# Patient Record
Sex: FEMALE | Race: ASIAN | NOT HISPANIC OR LATINO | URBAN - METROPOLITAN AREA
[De-identification: names, ages, dates, MRNs, and addresses within clinical notes are randomized per-mention and may not be internally consistent; named-entity substitution may affect disease eponyms.]

---

## 2019-03-31 ENCOUNTER — EMERGENCY (EMERGENCY)
Facility: HOSPITAL | Age: 24
LOS: 1 days | Discharge: HOME | End: 2019-03-31
Attending: EMERGENCY MEDICINE
Payer: COMMERCIAL

## 2019-03-31 VITALS
HEART RATE: 73 BPM | OXYGEN SATURATION: 98 % | RESPIRATION RATE: 17 BRPM | TEMPERATURE: 98 F | SYSTOLIC BLOOD PRESSURE: 109 MMHG | DIASTOLIC BLOOD PRESSURE: 65 MMHG

## 2019-03-31 VITALS
HEIGHT: 65 IN | HEART RATE: 80 BPM | OXYGEN SATURATION: 100 % | WEIGHT: 141.98 LBS | DIASTOLIC BLOOD PRESSURE: 98 MMHG | SYSTOLIC BLOOD PRESSURE: 136 MMHG | RESPIRATION RATE: 18 BRPM | TEMPERATURE: 98 F

## 2019-03-31 PROCEDURE — 99283 EMERGENCY DEPT VISIT LOW MDM: CPT

## 2019-03-31 RX ORDER — SODIUM CHLORIDE 9 MG/ML
1000 INJECTION INTRAMUSCULAR; INTRAVENOUS; SUBCUTANEOUS ONCE
Qty: 0 | Refills: 0 | Status: COMPLETED | OUTPATIENT
Start: 2019-03-31 | End: 2019-03-31

## 2019-03-31 RX ADMIN — SODIUM CHLORIDE 1000 MILLILITER(S): 9 INJECTION INTRAMUSCULAR; INTRAVENOUS; SUBCUTANEOUS at 22:06

## 2019-03-31 RX ADMIN — Medication 300 MILLIGRAM(S): at 23:07

## 2019-03-31 NOTE — ED PROVIDER NOTE - CARE PLAN
Goal:	**ATTENDING ADDENDUM (Dr. Fabio Magaña): Goals of care include resolution of emergent/urgent symptoms and concerns, and restoration to baseline level of homeostasis. Principal Discharge DX:	Cellulitis  Goal:	**ATTENDING ADDENDUM (Dr. Fabio Magaña): Goals of care include resolution of emergent/urgent symptoms and concerns, and restoration to baseline level of homeostasis.

## 2019-03-31 NOTE — ED PROVIDER NOTE - OBJECTIVE STATEMENT
24yo female pt, no significant PMHx, ambulatory 24yo female pt, no significant PMHx, ambulatory c/o worsening redness of wound, left upper back today. Pt stated she had recent admission at [facility] in [facility] for left upper back cellulitis s/p ? bug bite and discharged 2days ago with Augmentin. She noticed today the wound's worsen and a new rash on left upper arm. Denies fever, chills or bodyache. Denies headache, dizziness or visual changes. Denies CP/SOB/ABD pain or N/V/D. Denies sensory changes or weakness to extremities. 24yo female pt, no significant PMHx, ambulatory c/o worsening redness of wound, left upper back today. Pt stated she had recent admission at [facility] in [facility] for left upper back cellulitis s/p ? bug bite and discharged 2days ago with Augmentin. She noticed today the wound's worsen and a new rash on left upper arm. Denies fever, chills or bodyache. Denies headache, dizziness or visual changes. Denies CP/SOB/ABD pain or N/V/D. Denies sensory changes or weakness to extremities.  **ATTENDING ADDENDUM (Dr. Fabio Magaña): I attest that I have directly examined this patient and elicited a comparable history of present illness and review of systems with my collaborating provider (NP/PA). I attest that I have made significant contributions to the documentation where necessary and as noted in the EMR.

## 2019-03-31 NOTE — ED PROVIDER NOTE - CLINICAL SUMMARY MEDICAL DECISION MAKING FREE TEXT BOX
**ATTENDING MEDICAL DECISION MAKING/SYNTHESIS (Dr. Fabio Magaña): I have reviewed the Chief Complaint, the HPI, the ROS, and have directly performed and confirmed the findings on the Physical Examination. I have reviewed the medical decision making with all providers, as applicable. The PROBLEM REPRESENTATION at this time is: 23-year-old woman s/p recent admission at [facility] with worsening rash ?cellulitis s/p oral antibiotics following IV antibiotics and inpatient dermatology consultation now presenting with progressively worsening rash along the posterior aspect of the left shoulder and lateral upper arm. The MOST LIKELY DIAGNOSIS, and the LIST OF DIFFERENTIAL DIAGNOSES, includes (but is not limited to) the following: cellulitis, other inflammatory contact dermatitis or equivalent, allergic reaction, herpes zoster (considered, NO vesicles, therefore unlikely given presentation and clinical findings), serious bacterial infection or sepsis/severe sepsis e.g. MRSA or equivalent (less likely), necrotizing fasciitis (unlikely given presentation and clinical findings), erythema nodusum (NO evidence), insect or other bite (e.g. bed bugs or equivalent; considered). The likelihood of each of these diagnoses has been appropriately considered in the context of this patient's presentation and my evaluation. PLAN: as described in EMR, including diagnostics, therapeutics and consultation as clinically warranted. I will continue to reevaluate the patient, including the results of all testing, and monitor response to therapy throughout the patient's course in the ED.

## 2019-03-31 NOTE — ED PROVIDER NOTE - NSFOLLOWUPCLINICS_GEN_ALL_ED_FT
[facility] - [place]  [facility]  [address], [address]  [place] [address]  Phone: [phone]  Fax: [phone]  Follow Up Time:

## 2019-03-31 NOTE — ED PROVIDER NOTE - NSFOLLOWUPINSTRUCTIONS_ED_ALL_ED_FT
Keep wound clean and dry.  Keep continue Zyrtec for itching as directed.  Stop taking Augmentin and start Clindamycin 300mg every 6hours for 7days.  Please follow up with your dermatology or clinic [phone],  in 2days, call tomorrow for an appointment.  Seek immediate medical care for any new/worsening signs or symptoms.

## 2019-03-31 NOTE — ED PROVIDER NOTE - ATTENDING CONTRIBUTION TO CARE
**ATTENDING ADDENDUM (Dr. Fabio Magaña): I attest that I have directly examined this patient and reviewed and formulated the diagnostic and therapeutic management plan in collaboration with the advanced practitioner (NP, PA). Please see MDM note and remainder of EMR for findings from CC, HPI, ROS, and PE. ([place])

## 2019-03-31 NOTE — ED PROVIDER NOTE - PROGRESS NOTE DETAILS
**ATTENDING ADDENDUM (Dr. Fabio Magaña): patient serially evaluated throughout ED course. NO acute deterioration up to this time in the ED. Agree with goals/plan of ED care as described in EMR, including diagnostics, therapeutics and consultation as clinically warranted. Agree with change in antibiotics to clindamycin to provide possible coverage for MRSA (although this pathogen seems unlikely). Anticipatory guidance provided. NO evidence of septic shock, herpes zoster, necrotizing fasciitis, or other worrisome soft-tissue pathology of the skin. Agree with discharge home with close outpatient followup with primary care physician/provider.

## 2019-03-31 NOTE — ED PROVIDER NOTE - CHIEF COMPLAINT
The patient is a 23y Female complaining of wound check. The patient is a 23y Female complaining of wound check and skin erythema s/p possible insect bite to upper left shoulder/back area.

## 2019-03-31 NOTE — ED ADULT NURSE NOTE - NSIMPLEMENTINTERV_GEN_ALL_ED
Implemented All Universal Safety Interventions:  [place] to call system. Call bell, personal items and telephone within reach. Instruct patient to call for assistance. Room bathroom lighting operational. Non-slip footwear when patient is off stretcher. Physically safe environment: no spills, clutter or unnecessary equipment. Stretcher in lowest position, wheels locked, appropriate side rails in place.

## 2019-03-31 NOTE — ED PROVIDER NOTE - PHYSICAL EXAMINATION
NAd, VSS, Afebrile, Lungs clear. + Left upper back elevated rashes with warmth/ induration. No fluctuating abscess. + A small rash on left deltoid area without infection. No spinal tender. ABD soft, non tender. No CVA tender. Neuro- intact. NAd, VSS, Afebrile, Lungs clear. + Left upper back elevated rashes with warmth/ induration. No fluctuating abscess. + A small rash on left deltoid area without infection. No spinal tender. ABD soft, non tender. No CVA tender. Neuro- intact.  **ATTENDING ADDENDUM (Dr. Fabio Magaña): I have reviewed and substantially contributed to the elements of the PE as documented above. I have directly performed an examination of this patient in conjunction with the other members (EM resident/PA/NP) of the patient care team. Of note, and in addition to the above, examination of the patient's skin over the posterior left shoulder and left lateral upper arm reveals well-circumscribed areas of erythema without obvious vesicles or fluctuance but with warmth. NO contiguous spread. NO lymphangitic spread or lymphadenopathy noted. NO centralized areas of necrosis noted. NO discharge or crusting. NO desquamation noted. NO distal discoloration or numbness, tingling, weakness, or paresthesias noted in the left upper arm. Intact perfusion (distal pulse and capillary refill).

## 2019-03-31 NOTE — ED PROVIDER NOTE - PLAN OF CARE
**ATTENDING ADDENDUM (Dr. Fabio Magaña): Goals of care include resolution of emergent/urgent symptoms and concerns, and restoration to baseline level of homeostasis.

## 2019-03-31 NOTE — ED PROVIDER NOTE - RELIEVING FACTORS
**ATTENDING ADDENDUM (Dr. Fabio Magaña): on antibiotics over the past two days (amoxicillin-clavulanate)

## 2019-03-31 NOTE — ED ADULT NURSE NOTE - OBJECTIVE STATEMENT
dc 2 days ago from [facility] in [facility]; had bug bites on left upper back; does not know what bit her; wounds have returned; new lesions on left upper arm; has red lesions on back; on amox--clav 875-125 and cetirizine for itching; applied mupirocin cream to sites; denies fever and chills dc 2 days ago from [facility] in [facility]; had bug bites on left upper back; does not know what bit her; wounds have returned; new lesions on left upper arm; has red lesions on back; on amox--clav 875-125 and cetirizine for itching; applied mupirocin cream to sites; denies fever and chills; denies pain to area but area is itchy and burning

## 2020-03-05 ENCOUNTER — TRANSCRIPTION ENCOUNTER (OUTPATIENT)
Age: 25
End: 2020-03-05

## 2020-07-17 ENCOUNTER — RESULT REVIEW (OUTPATIENT)
Age: 25
End: 2020-07-17

## 2020-07-31 ENCOUNTER — APPOINTMENT (OUTPATIENT)
Dept: FAMILY MEDICINE | Facility: CLINIC | Age: 25
End: 2020-07-31
Payer: COMMERCIAL

## 2020-07-31 VITALS
DIASTOLIC BLOOD PRESSURE: 71 MMHG | TEMPERATURE: 98.7 F | HEART RATE: 70 BPM | WEIGHT: 144.83 LBS | OXYGEN SATURATION: 98 % | BODY MASS INDEX: 24.42 KG/M2 | HEIGHT: 64.57 IN | SYSTOLIC BLOOD PRESSURE: 112 MMHG

## 2020-07-31 DIAGNOSIS — Z00.00 ENCOUNTER FOR GENERAL ADULT MEDICAL EXAMINATION W/OUT ABNORMAL FINDINGS: ICD-10-CM

## 2020-07-31 DIAGNOSIS — Z78.9 OTHER SPECIFIED HEALTH STATUS: ICD-10-CM

## 2020-07-31 DIAGNOSIS — Z11.59 ENCOUNTER FOR SCREENING FOR OTHER VIRAL DISEASES: ICD-10-CM

## 2020-07-31 DIAGNOSIS — Z83.438 FAMILY HISTORY OF OTHER DISORDER OF LIPOPROTEIN METABOLISM AND OTHER LIPIDEMIA: ICD-10-CM

## 2020-07-31 DIAGNOSIS — Z82.49 FAMILY HISTORY OF ISCHEMIC HEART DISEASE AND OTHER DISEASES OF THE CIRCULATORY SYSTEM: ICD-10-CM

## 2020-07-31 PROCEDURE — 36415 COLL VENOUS BLD VENIPUNCTURE: CPT

## 2020-07-31 PROCEDURE — 99385 PREV VISIT NEW AGE 18-39: CPT | Mod: 25

## 2020-07-31 NOTE — HISTORY OF PRESENT ILLNESS
[FreeTextEntry1] : annual check up [de-identified] : 25 yo F with no significant PMH presents for annual check up. She has been experiencing bloating. She is currently under care of GI regarding this. Otherwise, no other complaints.\par \par Follows with GYN-- had pap on 7/17-- ascus. Pt to f/u with GYN regarding results.\par Menses normal.\par \par She will drop off immunization record.

## 2020-07-31 NOTE — HEALTH RISK ASSESSMENT
[] : No [Yes] : Yes [Monthly or less (1 pt)] : Monthly or less (1 point) [1 or 2 (0 pts)] : 1 or 2 (0 points) [Never (0 pts)] : Never (0 points) [No] : In the past 12 months have you used drugs other than those required for medical reasons? No [Audit-CScore] : 1 [[id number]] : 3 [Patient reported PAP Smear was abnormal] : Patient reported PAP Smear was abnormal [HIV test declined] : HIV test declined [Single] : single [Hepatitis C test declined] : Hepatitis C test declined [Sexually Active] : sexually active [Fully functional (bathing, dressing, toileting, transferring, walking, feeding)] : Fully functional (bathing, dressing, toileting, transferring, walking, feeding) [Fully functional (using the telephone, shopping, preparing meals, housekeeping, doing laundry, using] : Fully functional and needs no help or supervision to perform IADLs (using the telephone, shopping, preparing meals, housekeeping, doing laundry, using transportation, managing medications and managing finances) [PapSmearDate] : 07/20 [PapSmearComments] : ASCUS

## 2020-08-03 LAB
25(OH)D3 SERPL-MCNC: 26.9 NG/ML
ALBUMIN SERPL ELPH-MCNC: 4.7 G/DL
ALP BLD-CCNC: 70 U/L
ALT SERPL-CCNC: 17 U/L
ANION GAP SERPL CALC-SCNC: 12 MMOL/L
AST SERPL-CCNC: 19 U/L
BASOPHILS # BLD AUTO: 0.08 K/UL
BASOPHILS NFR BLD AUTO: 1.4 %
BILIRUB SERPL-MCNC: 0.2 MG/DL
BUN SERPL-MCNC: 12 MG/DL
CALCIUM SERPL-MCNC: 9.8 MG/DL
CHLORIDE SERPL-SCNC: 101 MMOL/L
CHOLEST SERPL-MCNC: 131 MG/DL
CHOLEST/HDLC SERPL: 2.2 RATIO
CO2 SERPL-SCNC: 24 MMOL/L
CREAT SERPL-MCNC: 0.69 MG/DL
EOSINOPHIL # BLD AUTO: 0.07 K/UL
EOSINOPHIL NFR BLD AUTO: 1.2 %
ESTIMATED AVERAGE GLUCOSE: 94 MG/DL
GLUCOSE SERPL-MCNC: 92 MG/DL
HBA1C MFR BLD HPLC: 4.9 %
HCT VFR BLD CALC: 38.4 %
HDLC SERPL-MCNC: 61 MG/DL
HGB BLD-MCNC: 12.3 G/DL
IMM GRANULOCYTES NFR BLD AUTO: 0.4 %
LDLC SERPL CALC-MCNC: 54 MG/DL
LYMPHOCYTES # BLD AUTO: 1.62 K/UL
LYMPHOCYTES NFR BLD AUTO: 28.9 %
MAN DIFF?: NORMAL
MCHC RBC-ENTMCNC: 31 PG
MCHC RBC-ENTMCNC: 32 GM/DL
MCV RBC AUTO: 96.7 FL
MONOCYTES # BLD AUTO: 0.42 K/UL
MONOCYTES NFR BLD AUTO: 7.5 %
NEUTROPHILS # BLD AUTO: 3.4 K/UL
NEUTROPHILS NFR BLD AUTO: 60.6 %
PLATELET # BLD AUTO: 345 K/UL
POTASSIUM SERPL-SCNC: 4.8 MMOL/L
PROT SERPL-MCNC: 7.4 G/DL
RBC # BLD: 3.97 M/UL
RBC # FLD: 13 %
SARS-COV-2 IGG SERPL IA-ACNC: 0.08 INDEX
SARS-COV-2 IGG SERPL QL IA: NEGATIVE
SODIUM SERPL-SCNC: 138 MMOL/L
TRIGL SERPL-MCNC: 79 MG/DL
TSH SERPL-ACNC: 0.58 UIU/ML
WBC # FLD AUTO: 5.61 K/UL

## 2020-08-07 ENCOUNTER — TRANSCRIPTION ENCOUNTER (OUTPATIENT)
Age: 25
End: 2020-08-07

## 2020-08-10 ENCOUNTER — TRANSCRIPTION ENCOUNTER (OUTPATIENT)
Age: 25
End: 2020-08-10

## 2020-08-14 ENCOUNTER — TRANSCRIPTION ENCOUNTER (OUTPATIENT)
Age: 25
End: 2020-08-14

## 2021-06-09 ENCOUNTER — APPOINTMENT (OUTPATIENT)
Dept: FAMILY MEDICINE | Facility: CLINIC | Age: 26
End: 2021-06-09

## 2021-06-14 ENCOUNTER — APPOINTMENT (OUTPATIENT)
Dept: FAMILY MEDICINE | Facility: CLINIC | Age: 26
End: 2021-06-14

## 2021-07-14 ENCOUNTER — APPOINTMENT (OUTPATIENT)
Dept: FAMILY MEDICINE | Facility: CLINIC | Age: 26
End: 2021-07-14
Payer: COMMERCIAL

## 2021-07-14 VITALS
BODY MASS INDEX: 25.46 KG/M2 | HEIGHT: 64.57 IN | OXYGEN SATURATION: 98 % | HEART RATE: 91 BPM | SYSTOLIC BLOOD PRESSURE: 109 MMHG | DIASTOLIC BLOOD PRESSURE: 75 MMHG | TEMPERATURE: 98 F | WEIGHT: 151 LBS

## 2021-07-14 DIAGNOSIS — R09.81 NASAL CONGESTION: ICD-10-CM

## 2021-07-14 PROCEDURE — 99072 ADDL SUPL MATRL&STAF TM PHE: CPT

## 2021-07-14 PROCEDURE — 99213 OFFICE O/P EST LOW 20 MIN: CPT

## 2021-07-14 RX ORDER — FLUTICASONE PROPIONATE 50 UG/1
50 SPRAY, METERED NASAL
Qty: 1 | Refills: 1 | Status: ACTIVE | COMMUNITY
Start: 2021-07-14 | End: 1900-01-01

## 2021-07-14 NOTE — PHYSICAL EXAM
[Normal] : affect was normal and insight and judgment were intact [de-identified] : nontender sinuses

## 2021-07-14 NOTE — HISTORY OF PRESENT ILLNESS
[FreeTextEntry8] : cc-- congestion, dry cough\par \par 26 yo F with nasal congestion/dry cough starting last week, not getting better. Mostly clear congestion, did have 1 episode yellow congestion. Worse at night but throughout the day. Has been using robafen cough syrup without improvement. Feels more of a tickle. No headache, no sick contacts. Did have 2 weeks of sneezing, runny nose prior with resolution. No itchy watery eyes, denies hx allergies.\par \par completed covid vaccines. 2 months ago.\par \par

## 2021-07-14 NOTE — REVIEW OF SYSTEMS
[Hoarseness] : hoarseness [Nasal Discharge] : nasal discharge [Postnasal Drip] : postnasal drip [Negative] : Heme/Lymph [FreeTextEntry4] : per HPI

## 2021-07-16 LAB
RAPID RVP RESULT: NOT DETECTED
SARS-COV-2 RNA PNL RESP NAA+PROBE: NOT DETECTED

## 2021-07-19 ENCOUNTER — RESULT REVIEW (OUTPATIENT)
Age: 26
End: 2021-07-19

## 2021-07-23 ENCOUNTER — TRANSCRIPTION ENCOUNTER (OUTPATIENT)
Age: 26
End: 2021-07-23

## 2021-08-11 ENCOUNTER — RESULT REVIEW (OUTPATIENT)
Age: 26
End: 2021-08-11

## 2021-09-30 ENCOUNTER — EMERGENCY (EMERGENCY)
Facility: HOSPITAL | Age: 26
LOS: 1 days | Discharge: HOME | End: 2021-09-30
Attending: STUDENT IN AN ORGANIZED HEALTH CARE EDUCATION/TRAINING PROGRAM
Payer: COMMERCIAL

## 2021-09-30 VITALS
HEART RATE: 54 BPM | DIASTOLIC BLOOD PRESSURE: 72 MMHG | TEMPERATURE: 98 F | OXYGEN SATURATION: 100 % | RESPIRATION RATE: 18 BRPM | SYSTOLIC BLOOD PRESSURE: 115 MMHG

## 2021-09-30 VITALS
RESPIRATION RATE: 19 BRPM | HEART RATE: 63 BPM | SYSTOLIC BLOOD PRESSURE: 148 MMHG | OXYGEN SATURATION: 98 % | HEIGHT: 65 IN | TEMPERATURE: 98 F | DIASTOLIC BLOOD PRESSURE: 80 MMHG | WEIGHT: 149.91 LBS

## 2021-09-30 PROCEDURE — 99283 EMERGENCY DEPT VISIT LOW MDM: CPT

## 2021-09-30 RX ORDER — HYDROXYZINE HCL 10 MG
25 TABLET ORAL ONCE
Refills: 0 | Status: COMPLETED | OUTPATIENT
Start: 2021-09-30 | End: 2021-09-30

## 2021-09-30 RX ORDER — HYDROXYZINE HCL 10 MG
1 TABLET ORAL
Qty: 20 | Refills: 0
Start: 2021-09-30 | End: 2021-10-04

## 2021-09-30 RX ORDER — DIPHENHYDRAMINE HCL 50 MG
25 CAPSULE ORAL ONCE
Refills: 0 | Status: DISCONTINUED | OUTPATIENT
Start: 2021-09-30 | End: 2021-09-30

## 2021-09-30 RX ADMIN — Medication 25 MILLIGRAM(S): at 08:03

## 2021-09-30 NOTE — ED ADULT NURSE NOTE - NSIMPLEMENTINTERV_GEN_ALL_ED
Implemented All Universal Safety Interventions:  [place] to call system. Call bell, personal items and telephone within reach. Instruct patient to call for assistance. Room bathroom lighting operational. Non-slip footwear when patient is off stretcher. Physically safe environment: no spills, clutter or unnecessary equipment. Stretcher in lowest position, wheels locked, appropriate side rails in place.

## 2021-09-30 NOTE — ED PROVIDER NOTE - NS ED ROS FT
General: denies fever, chills  HENT: denies nasal congestion, rhinorrhea  Eyes: denies visual changes, blurred vision  CV: denies chest pain, palpitations  Resp: denies difficulty breathing, cough  Abdominal: denies nausea, vomiting, diarrhea, abdominal pain  : denies urinary pain or discharge  MSK: denies muscle aches, leg swelling  Neuro: denies headaches, numbness, tingling  Skin: hives

## 2021-09-30 NOTE — ED PROVIDER NOTE - PATIENT PORTAL LINK FT
You can access the FollowMyHealth Patient Portal offered by [facility] by registering at the following website: [email or web address]. By joining [employer]’s FollowMyHealth portal, you will also be able to view your health information using other applications (apps) compatible with our system.

## 2021-09-30 NOTE — ED PROVIDER NOTE - NSFOLLOWUPCLINICS_GEN_ALL_ED_FT
[facility]  Allergy  [address] [place] [address]  Phone: [phone]  Fax:

## 2021-09-30 NOTE — ED PROVIDER NOTE - PHYSICAL EXAMINATION
GENERAL: well appearing in no acute distress, non-toxic appearing  HEAD: normocephalic, atraumatic  HENT: airway intact, uvula midline  EYES: normal conjunctiva, EOMI, PERRL  CARDIAC: regular rate and rhythm, normal S1S2, no appreciable murmurs, 2+ pulses in UE/LE b/l  PULM: normal breath sounds, clear to ascultation bilaterally, no rales, rhonchi, wheezing  GI: abdomen nondistended, soft, nontender, no guarding, rebound tenderness  NEURO: no focal motor or sensory deficits, CN2-12 intact, normal speech  MSK: no peripheral edema, no calf tenderness b/l  SKIN: raised wheals throughout flank, abdomen, chest, leg   PSYCH: appropriate mood and affect

## 2021-09-30 NOTE — ED ADULT NURSE NOTE - OBJECTIVE STATEMENT
25y/o female aaox4 h/o acid reflux  presents to the ED ambulatory  from home c/o allergic reaction  . Pt states that last Saturday sudden onset a allergic reaction : hives worsening Monday, pt came to ED Tuesday where prescriptive w/ steroids and benadryl ( Last dose this morning) w/o any relief. Upon assessment hives is generalized worse on the upper body : chest and back , reports itching,     . Denies fever, chills, n/v, weakness, abd pain, diarrhea/constipation, numbness/tingling, urinary symptoms , in no respiratory distress, no CP, Safety and comfort measures initiated- bed placed in lowest position and side rails raised. Pt oriented to call bell system.

## 2021-09-30 NOTE — ED PROVIDER NOTE - ATTENDING CONTRIBUTION TO CARE
I performed a history and physical exam of the patient and discussed their management with the resident.  I reviewed the resident's note and agree with the documented findings and plan of care except as noted below. My medical decision making and observations are as follows:    27yo F w/ no significant pmh in for hives x 3 days. Went to Urgent care and received 20mg prednisone and has been taken 25mg diphenhydramine q6 hours w/ intermittent relief. Denies any new clothing, laundry detergents soaps, or foods.  Pt has diffuse urticaria to shoulders and scattered urticaria to trunk and arms.  No wheezing, abd pain, or oropharyngeal edema.  Plan for atarax and reassess.  patient will need allergist follow up.  - Thi Meza DO

## 2021-09-30 NOTE — ED PROVIDER NOTE - NSFOLLOWUPINSTRUCTIONS_ED_ALL_ED_FT
Discharge instructions:    - Please follow up with your Primary Care Doctor within the next 24-72 hours.    - We recommend you make an appointment with an Allergist.     -Take any prescribed medications as instructed:     - Others:          Return to the Emergency Department if you experience difficulty breathing or swallowing, recurrent vomiting, rashes, lip/mouth/tongue swelling, persistent fevers or for any other concerning symptoms. Discharge instructions:    - Please follow up with your Primary Care Doctor within the next 24-72 hours.    - We recommend you make an appointment with an Allergist.     -Take any prescribed medications as instructed:     - Others:      Return to the Emergency Department if you experience difficulty breathing or swallowing, recurrent vomiting, rashes, lip/mouth/tongue swelling, persistent fevers or for any other concerning symptoms.

## 2021-09-30 NOTE — ED PROVIDER NOTE - OBJECTIVE STATEMENT
25yo F w/ no significant pmh in for hives x 3 days. Awoke Tuesday 9/28 w/ itchiness and the development of hives. Went to Urgent care and received 20mg prednisone and has been taken 25mg diphenhydramine q6 hours w/ internment relief. Denies any new clothing, laundry degerents, soaps, or lotions. No one at home has similar symptoms. Denies any nausea, vomiting, difficulty breathing, or wheezing.

## 2021-09-30 NOTE — ED PROVIDER NOTE - CLINICAL SUMMARY MEDICAL DECISION MAKING FREE TEXT BOX
25yo F w/ no significant pmh in for hives x 3 days; no concern for anaphylaxis at this time. Patient on prednisone + benadryl; will provide atarax and reassess; likely discharge with allergy follow up

## 2021-09-30 NOTE — ED PROVIDER NOTE - PROGRESS NOTE DETAILS
Kulwant, PGY2: Patient reassessed and noting symptomatic relief. Will d/c with atarax and allergist follow up. Strict return precautions provided and patient understands and agrees w/ plan.

## 2022-04-11 PROBLEM — Z11.59 SCREENING FOR VIRAL DISEASE: Status: ACTIVE | Noted: 2020-07-31

## 2023-04-18 NOTE — ED ADULT NURSE NOTE - DISTAL EXTREMITY COLOR
color consistent with ethnicity/race Birth Control Pills Counseling: Birth Control Pill Counseling: I discussed with the patient the potential side effects of OCPs including but not limited to increased risk of stroke, heart attack, thrombophlebitis, deep venous thrombosis, hepatic adenomas, breast changes, GI upset, headaches, and depression.  The patient verbalized understanding of the proper use and possible adverse effects of OCPs. All of the patient's questions and concerns were addressed.

## 2023-05-31 NOTE — ED ADULT TRIAGE NOTE - ISOLATION TYPE:
None Hydroxyzine Counseling: Patient advised that the medication is sedating and not to drive a car after taking this medication.  Patient informed of potential adverse effects including but not limited to dry mouth, urinary retention, and blurry vision.  The patient verbalized understanding of the proper use and possible adverse effects of hydroxyzine.  All of the patient's questions and concerns were addressed.

## 2025-06-19 ENCOUNTER — APPOINTMENT (OUTPATIENT)
Dept: OBGYN | Facility: CLINIC | Age: 30
End: 2025-06-19
Payer: COMMERCIAL

## 2025-06-19 VITALS
TEMPERATURE: 97.6 F | DIASTOLIC BLOOD PRESSURE: 78 MMHG | RESPIRATION RATE: 16 BRPM | SYSTOLIC BLOOD PRESSURE: 116 MMHG | BODY MASS INDEX: 27.83 KG/M2 | HEART RATE: 95 BPM | HEIGHT: 64.57 IN | OXYGEN SATURATION: 97 % | WEIGHT: 165 LBS

## 2025-06-19 PROBLEM — N89.8 VAGINAL DISCHARGE: Status: ACTIVE | Noted: 2025-06-19

## 2025-06-19 PROBLEM — F17.210 CIGARETTE SMOKER: Status: ACTIVE | Noted: 2025-06-19

## 2025-06-19 LAB
HCG UR QL: NEGATIVE
QUALITY CONTROL: YES

## 2025-06-19 PROCEDURE — 99459 PELVIC EXAMINATION: CPT

## 2025-06-19 PROCEDURE — 99203 OFFICE O/P NEW LOW 30 MIN: CPT

## 2025-06-19 PROCEDURE — 81025 URINE PREGNANCY TEST: CPT

## 2025-06-20 LAB
BV BACTERIA RRNA VAG QL NAA+PROBE: NOT DETECTED
C GLABRATA RNA VAG QL NAA+PROBE: NOT DETECTED
C TRACH RRNA SPEC QL NAA+PROBE: NOT DETECTED
CANDIDA RRNA VAG QL PROBE: NOT DETECTED
N GONORRHOEA RRNA SPEC QL NAA+PROBE: NOT DETECTED
T VAGINALIS RRNA SPEC QL NAA+PROBE: NOT DETECTED

## 2025-06-30 ENCOUNTER — APPOINTMENT (OUTPATIENT)
Dept: OBGYN | Facility: CLINIC | Age: 30
End: 2025-06-30
Payer: COMMERCIAL

## 2025-06-30 ENCOUNTER — ASOB RESULT (OUTPATIENT)
Age: 30
End: 2025-06-30

## 2025-06-30 VITALS
OXYGEN SATURATION: 97 % | DIASTOLIC BLOOD PRESSURE: 74 MMHG | SYSTOLIC BLOOD PRESSURE: 113 MMHG | HEART RATE: 98 BPM | TEMPERATURE: 97.6 F | RESPIRATION RATE: 16 BRPM

## 2025-06-30 PROCEDURE — 76830 TRANSVAGINAL US NON-OB: CPT

## 2025-06-30 PROCEDURE — 76856 US EXAM PELVIC COMPLETE: CPT | Mod: 59

## 2025-07-01 ENCOUNTER — NON-APPOINTMENT (OUTPATIENT)
Age: 30
End: 2025-07-01